# Patient Record
Sex: FEMALE | Race: ASIAN | Employment: FULL TIME | URBAN - METROPOLITAN AREA
[De-identification: names, ages, dates, MRNs, and addresses within clinical notes are randomized per-mention and may not be internally consistent; named-entity substitution may affect disease eponyms.]

---

## 2021-03-14 ENCOUNTER — HOSPITAL ENCOUNTER (EMERGENCY)
Facility: CLINIC | Age: 56
Discharge: HOME | End: 2021-03-14
Attending: EMERGENCY MEDICINE | Admitting: EMERGENCY MEDICINE
Payer: OTHER MISCELLANEOUS

## 2021-03-14 VITALS
DIASTOLIC BLOOD PRESSURE: 100 MMHG | WEIGHT: 165 LBS | HEART RATE: 97 BPM | SYSTOLIC BLOOD PRESSURE: 175 MMHG | OXYGEN SATURATION: 99 % | RESPIRATION RATE: 20 BRPM | TEMPERATURE: 98.4 F
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
## 2021-03-14 DIAGNOSIS — H57.11 ACUTE RIGHT EYE PAIN: ICD-10-CM

## 2021-03-14 DIAGNOSIS — S05.01XA ABRASION OF RIGHT CORNEA, INITIAL ENCOUNTER: ICD-10-CM

## 2021-03-14 PROCEDURE — 250N000009 HC RX 250

## 2021-03-14 PROCEDURE — 99284 EMERGENCY DEPT VISIT MOD MDM: CPT | Performed by: EMERGENCY MEDICINE

## 2021-03-14 PROCEDURE — 99281 EMR DPT VST MAYX REQ PHY/QHP: CPT

## 2021-03-14 PROCEDURE — 99283 EMERGENCY DEPT VISIT LOW MDM: CPT | Performed by: EMERGENCY MEDICINE

## 2021-03-14 RX ORDER — CIPROFLOXACIN HYDROCHLORIDE 3.5 MG/ML
2 SOLUTION/ DROPS TOPICAL 4 TIMES DAILY
Qty: 2 ML | Refills: 0 | Status: SHIPPED | OUTPATIENT
Start: 2021-03-14 | End: 2021-03-19

## 2021-03-14 RX ORDER — PROPARACAINE HYDROCHLORIDE 5 MG/ML
2 SOLUTION/ DROPS OPHTHALMIC ONCE
Status: DISCONTINUED | OUTPATIENT
Start: 2021-03-14 | End: 2021-03-14 | Stop reason: HOSPADM

## 2021-03-14 ASSESSMENT — VISUAL ACUITY
OD: 20/20
OS: 20/25

## 2021-03-14 NOTE — DISCHARGE INSTRUCTIONS
Please make an appointment to follow up with Eye Clinic (phone: [phone]) as soon as possible if not improving.    Use the ciprofloxacin drops as prescribed in the right eye 2 drops 4 times daily.     Return to the ED if you develop worsening eye pain, vision changes, fever, drainage from the eye, increased redness or swelling of the eye, or any new or worsening concerns.

## 2021-03-14 NOTE — ED TRIAGE NOTES
Pt is employee as ([employer] ) working in [facility] ED. Patient stated that she was cleaning bathroom when she felt something entered her right eye. Patient stated that she attempted to washout at eyewash station but not improved.  Writer noticed her right is slighly red and reported burning but able to see without issues.

## 2021-03-14 NOTE — ED PROVIDER NOTES
[facility] [place], [place] EMERGENCY DEPARTMENT ([facility])     March 14, 2021  History     Chief Complaint   Patient presents with     Eye Pain     patient states that she felt something entered her eye while cleaning bathroom in Ped ED ,     HPI  Trinidad Melgoza is a 55 year old female with a PMH of dermatochalasis of both upper eyelids, presbyopia, hyperopia, and asthma who presents to the ED today complaining of right eye pain.  Patient is an employee and works with environmental services.  She states that she was dusting in the bathroom when she noted a particle enter into her right eye.  She adamantly states that there was no chemicals or cleaning solutions that she was using and nothing splashed into her eye.  She states that she was rubbing her eye attempting to get this removed.  She did wash out the eye at the eye station but was having pain that she describes as burning.  She denies any changes in her vision.  She denies any drainage from the eye.  She denies any trauma to the periorbital area and nothing fell on her.  She states she does not wear contacts.  She denies any headache, nausea, vomiting, chest pain, shortness of breath, or other complaints.  No double vision.  She states otherwise she was feeling entirely normally today before this happened.    I have reviewed the Medications, Allergies, Past Medical and Surgical History, and Social History in the [facility] system.  PAST MEDICAL HISTORY: No past medical history on file.    PAST SURGICAL HISTORY: No past surgical history on file.    Past medical history, past surgical history, medications, and allergies were reviewed with the patient. Additional pertinent items: None    FAMILY HISTORY: No family history on file.    SOCIAL HISTORY:   Social History     Tobacco Use     Smoking status: Not on file   Substance Use Topics     Alcohol use: Not on file     Social history was reviewed with the patient. Additional pertinent items: None      Discharge Medication  List as of 3/14/2021 11:43 AM      START taking these medications    Details   ciprofloxacin (CILOXAN) 0.3 % ophthalmic solution Place 2 drops into the right eye 4 times daily for 5 days, Disp-2 mL, R-0, Local Print              No Known Allergies     Review of Systems  A complete review of systems was performed with pertinent positives and negatives noted in the HPI, and all other systems negative.    Physical Exam   BP: (!) 175/100  Pulse: 97  Temp: 98.4  F (36.9  C)  Resp: 20  Weight: 74.8 kg (165 lb)  SpO2: 99 %      Physical Exam  Vitals signs reviewed.   Constitutional:       General: She is not in acute distress.     Appearance: She is well-developed.   HENT:      Head: Normocephalic and atraumatic.      Mouth/Throat:      Mouth: Mucous membranes are moist.      Pharynx: No oropharyngeal exudate.   Eyes:      General: Lids are normal. Lids are everted, no foreign bodies appreciated. Vision grossly intact. No visual field deficit.        Right eye: No foreign body or discharge.         Left eye: No foreign body or discharge.      Intraocular pressure: Right eye pressure is 13 mmHg. Left eye pressure is 14 mmHg. Measurements were taken using a handheld tonometer.     Extraocular Movements: Extraocular movements intact.      Conjunctiva/sclera:      Right eye: Right conjunctiva is injected (mildly). No chemosis, exudate or hemorrhage.     Pupils: Pupils are equal, round, and reactive to light.      Right eye: Corneal abrasion and fluorescein uptake present.      Left eye: No fluorescein uptake.      Slit lamp exam:     Right eye: No foreign body or photophobia.      Comments: Color vision is normal.  Visual acuity is normal as documented by the nurse which was 20/20 in the right and 20/25 in the left.  No hyphema noted.  Pupil is 3 mm and reactive bilaterally.  There was a small amount of fluorescein uptake indicating abrasion.  Patient reported proparacaine completely resolved her symptoms.   Neck:       Musculoskeletal: Normal range of motion and neck supple.   Cardiovascular:      Rate and Rhythm: Normal rate and regular rhythm.      Pulses: Normal pulses.      Heart sounds: Normal heart sounds. No murmur.   Pulmonary:      Effort: Pulmonary effort is normal. No respiratory distress.      Breath sounds: Normal breath sounds. No wheezing or rales.   Abdominal:      General: Bowel sounds are normal. There is no distension.      Palpations: Abdomen is soft.      Tenderness: There is no abdominal tenderness.   Musculoskeletal: Normal range of motion.   Skin:     General: Skin is warm and dry.      Capillary Refill: Capillary refill takes less than 2 seconds.      Findings: No rash.   Neurological:      General: No focal deficit present.      Mental Status: She is alert and oriented to person, place, and time.      GCS: GCS eye subscore is 4. GCS verbal subscore is 5. GCS motor subscore is 6.      Cranial Nerves: No cranial nerve deficit.      Sensory: No sensory deficit.      Motor: No weakness.         ED Course        Procedures                           No results found for this or any previous visit (from the past 24 hour(s)).  Medications - No data to display          Assessments & Plan (with Medical Decision Making)   Patient is an employee here working with environmental services and she states she was dusting in the bathroom when a particle fell into her right eye and she tried to rub this and wash it out and was having burning pain so presented here.  She adamantly states that she was not working with any chemicals or cleaning solutions and nothing splashed into her eye.  She also states there was no other trauma to the eye.  She has no periorbital swelling.  She does have some mild injected conjunctiva on the right likely from the eye washing.  Pupils are equal round and reactive bilaterally.  Visual acuity is normal here.  No visual field deficit.  Eyelid was everted and there was no sign of any remaining  foreign body.  No foreign body on the cornea noted.  No hyphema.  No drainage from the eye.  Intraocular pressures are normal bilaterally.  She did have a small amount of fluorescein stain uptake suggesting corneal abrasion to the right eye over the pupil area.  Do suspect that whatever the foreign body was did scratch this area.  As she is adamant that she did not use any chemicals, it would be very unlikely that she had any chemical injury causing this.  She is already irrigated her eye and there is no evidence of retained foreign body.  I did discuss the patient with ophthalmology who recommended follow-up in their clinic and ciprofloxacin drops which she was prescribed.  Patient is very anxious to be discharged and is feeling improved.  Discussed that she should follow-up with ophthalmology as soon as possible if she is not improving.  She was provided the number.  She was given strict return precautions to the emergency department.  She voiced understanding was comfortable with this plan.  She states she does have some hypertension at baseline but does not want her vitals repeated and would like to be discharged.  She was discharged home in stable condition.    I have reviewed the nursing notes.    I have reviewed the findings, diagnosis, plan and need for follow up with the patient.    Discharge Medication List as of 3/14/2021 11:43 AM      START taking these medications    Details   ciprofloxacin (CILOXAN) 0.3 % ophthalmic solution Place 2 drops into the right eye 4 times daily for 5 days, Disp-2 mL, R-0, Local Print             Final diagnoses:   Acute right eye pain   Abrasion of right cornea, initial encounter       3/14/2021   [facility] EMERGENCY DEPARTMENT     Stephanie Cortez MD  04/30/21 0019

## 2021-03-14 NOTE — ED PROVIDER NOTES
Emergency Medicine Brief Assessment Note    Trinidad Melgoza is a 55 year old female who is a  in the Pediatric ED who reported that she was cleaning when she felt like something entered her right eye. She reports having a foreign body sensation to the upper lateral aspect of her eye. Her eye feels itchy. She denies using any cleaning solutions or chemicals at the time that she began having eye pain. Directed in Peds ED to begin using eye rinse station. On brief exam, she has intact EOM, the right eye has conjunctival injection, no foreign body identified during my brief exam. Patient reports decreased pain and pruritus following rinsing the eye.      Plan: Transfer to [facility] adult ED for complete eye evaluation and treatment    José Luis Dupree MD  Attending Emergency Physician  10:29 AM 3/14/2021       José Luis Dupree MD  03/14/21 1044

## 2021-03-14 NOTE — ED NOTES
Pt to discharge home. Discussed AVS and answered all questions at this time. Discussed home rx.

## 2021-04-30 ASSESSMENT — VISUAL ACUITY: OU: 1

## 2021-04-30 ASSESSMENT — TONOMETRY
OS_IOP_MMHG: 14
IOP_HANDHELD: 1
OD_IOP_MMHG: 13